# Patient Record
Sex: MALE | Race: WHITE | URBAN - METROPOLITAN AREA
[De-identification: names, ages, dates, MRNs, and addresses within clinical notes are randomized per-mention and may not be internally consistent; named-entity substitution may affect disease eponyms.]

---

## 2019-11-19 ENCOUNTER — OFFICE VISIT (OUTPATIENT)
Dept: UROLOGY | Age: 39
End: 2019-11-19

## 2019-11-19 ENCOUNTER — HOSPITAL ENCOUNTER (OUTPATIENT)
Dept: LAB | Age: 39
Discharge: HOME | End: 2019-11-19

## 2019-11-19 VITALS
BODY MASS INDEX: 30.88 KG/M2 | DIASTOLIC BLOOD PRESSURE: 113 MMHG | OXYGEN SATURATION: 95 % | HEART RATE: 89 BPM | WEIGHT: 228 LBS | HEIGHT: 72 IN | SYSTOLIC BLOOD PRESSURE: 167 MMHG

## 2019-11-19 DIAGNOSIS — N52.9 ERECTILE DYSFUNCTION, UNSPECIFIED ERECTILE DYSFUNCTION TYPE: ICD-10-CM

## 2019-11-19 DIAGNOSIS — N52.9 ERECTILE DYSFUNCTION, UNSPECIFIED ERECTILE DYSFUNCTION TYPE: Primary | ICD-10-CM

## 2019-11-19 LAB — SENTARA SPECIMEN COL,SENBCF: NORMAL

## 2019-11-19 PROCEDURE — 99001 SPECIMEN HANDLING PT-LAB: CPT

## 2019-11-19 NOTE — PROGRESS NOTES
MrDon Bermudez has a reminder for a \"due or due soon\" health maintenance. I have asked that he contact his primary care provider for follow-up on this health maintenance.

## 2019-11-19 NOTE — PATIENT INSTRUCTIONS
Hypogonadism: Care Instructions  Your Care Instructions    Men who have hypogonadism do not make enough testosterone. This hormone allows men to make sperm and to have normal physical male traits. The condition also is known as testosterone deficiency. It can lead to loss of sex drive, weakness, impotence, infertility, and weakened bones. Many things can cause this condition. Causes include injured testicles, certain medicines, an infection, and aging. Having a long-term health problem such as kidney or liver disease or being obese can cause it. So can surgery or radiation treatment for another health problem. It also can be present at birth. It is most often treated with testosterone hormone. You can get the hormone as a shot or through a patch or gel on the skin. Follow-up care is a key part of your treatment and safety. Be sure to make and go to all appointments, and call your doctor if you are having problems. It's also a good idea to know your test results and keep a list of the medicines you take. How can you care for yourself at home? · Take your medicines exactly as prescribed. Call your doctor if you think you are having a problem with your medicine. You will get more details on the specific medicines your doctor prescribes. · Follow your treatment plan. If you use testosterone hormones, follow your doctor's instructions. Hormones can help relieve many of the effects of this condition, such as impotence. But it may take weeks or months for your symptoms to improve. · Get plenty of exercise. And make sure to get plenty of calcium and vitamin D in your diet. Eat more dairy foods and green vegetables. They can help keep your bones from getting weak. · If you have a hard time dealing with this condition, talk to your doctor about joining a support group. Talking with others who have the same problems can help you cope. When should you call for help?   Call your doctor now or seek immediate medical care if:    · You have headaches.     · You have problems with your vision.    Watch closely for changes in your health, and be sure to contact your doctor if:    · You have trouble getting or keeping an erection.     · You have a loss of body hair.     · You feel weak or tired a lot of the time.     · Your breasts are getting larger.     · You do not get better as expected. Where can you learn more? Go to http://juan josé-aston.info/. Enter 99 [id number] in the search box to learn more about \"Hypogonadism: Care Instructions. \"  Current as of: November 6, 2018  Content Version: 12.2  © 9441-9680 [employer]. Care instructions adapted under license by [employer] (which disclaims liability or warranty for this information). If you have questions about a medical condition or this instruction, always ask your healthcare professional. [address] any warranty or liability for your use of this information.

## 2019-11-19 NOTE — PROGRESS NOTES
Chief Complaint   Patient presents with    New Patient    Erectile Dysfunction       HISTORY OF PRESENT ILLNESS:  Virginia Darling is a 35 y.o. male who presents today with a chief complaint of some lack of libido and lessening ability to have normal erections for about the past year. He denies any exposure to any type of toxic type material.  He has never had any trauma nor has he had any back surgery. He does not have any unrecognizable diseases that he is currently aware of. ROS documented on the chart    No past medical history on file. No past surgical history on file. Social History     Tobacco Use    Smoking status: Current Every Day Smoker     Packs/day: 1.00     Years: 15.00     Pack years: 15.00    Smokeless tobacco: Current User   Substance Use Topics    Alcohol use: Yes    Drug use: Not Currently       Allergies   Allergen Reactions    Amoxicillin Hives    Prednisone Swelling     PT STATES HE IS ALLERGIC TO ALL STEROIDS. REACTION IS TONGUE SWELLING. No family history on file. No results found for: PSA, PSA2, Bob Ezequiel, PSAR3, [id number], [id number], PSALT, 72297, PSAEXT           PHYSICAL EXAMINATION:   Visit Vitals  BP (!) 167/113 (BP 1 Location: Left arm, BP Patient Position: Sitting) Comment: reported to Dr Altaf Noel   Pulse 89   Ht 6' (1.829 m)   Wt 228 lb (103.4 kg)   SpO2 95%   BMI 30.92 kg/m²     Constitutional: WDWN, Pleasant and appropriate affect, No acute distress. CV:  No peripheral swelling noted  Respiratory: No respiratory distress or difficulties  Abdomen:  No abdominal masses or tenderness. No CVA tenderness. No inguinal hernias noted.  Male:    YONAS:Perineum normal to visual inspection, no erythema or irritation, normal anal sphincter tone with no lesions other than some external hemorrhoids. The prostate is about 30 g in size and smooth and symmetrical with no nodularity. It is generally benign in consistency.     SCROTUM:  No scrotal rash or lesions noticed. Normal bilateral testes and epididymis. No evidence of inguinal hernia. PENIS: Urethral meatus normal in location and size. No urethral discharge. Skin: No jaundice. Neuro/Psych:  Alert and oriented x 3, affect appropriate. Lymphatic:   No enlarged inguinal lymph nodes. No results found for this or any previous visit. REVIEW OF LABS AND IMAGING:     Imaging Report Reviewed? NO     Images Reviewed? NO           Other Lab Data Reviewed? NO         ASSESSMENT:     ICD-10-CM ICD-9-CM    1. Erectile dysfunction, unspecified erectile dysfunction type N52.9 607.84 FSH AND LH      ESTROGENS, FRACTIONATED      TESTOSTERONE, FREE & TOTAL            PLAN / DISCUSSION: : Relative onset in the past year of decreased libido and sexual performance. He has not tried any medication or any other type of over-the-counter drugs to correct this. I am going to assume initially that he has a form of male hypogonadism and I am going to go ahead and get a free and total serum testosterone levels, FSH, LH, and total estrogen levels. I have told him that as he is aware, our office is closing in about 3 weeks and we really will not have time enough to institute adequate treatment for this so I would like for him to follow-up with an endocrinologist and have his blood work reviewed by them. He is agreeable to do this and we will make an appointment for him with Dr. Lela Gordillo. The patient expresses understanding and agreement of the discussion and plan. Britt Cheng MD on 11/19/2019         Please note:  Voice recognition was used to generate this report, which may have resulted in some phonetic based errors in grammar and contents. Even though attempts were made to correct all the mistakes, some may have been missed, and remained in the body of the document.

## 2019-11-23 LAB
% FREE TESTOSTERONE: 2.53 % (ref 1.5–4.2)
FREE TESTOSTERONE,DIRECT, 144981: 9.92 NG/DL (ref 5–21)
FSH SERPL-ACNC: 5.8 MIU/ML (ref 1.5–12.4)
LH SERPL-ACNC: 5.5 MIU/ML (ref 1.7–8.6)
TESTOSTERONE: 392 NG/DL (ref 264–916)

## 2019-12-04 ENCOUNTER — DOCUMENTATION ONLY (OUTPATIENT)
Dept: UROLOGY | Age: 39
End: 2019-12-04